# Patient Record
Sex: FEMALE | Race: OTHER
[De-identification: names, ages, dates, MRNs, and addresses within clinical notes are randomized per-mention and may not be internally consistent; named-entity substitution may affect disease eponyms.]

---

## 2018-11-17 ENCOUNTER — HOSPITAL ENCOUNTER (EMERGENCY)
Dept: HOSPITAL 72 - EMR | Age: 43
Discharge: HOME | End: 2018-11-17
Payer: SELF-PAY

## 2018-11-17 VITALS — HEIGHT: 63 IN | WEIGHT: 134 LBS | BODY MASS INDEX: 23.74 KG/M2

## 2018-11-17 VITALS — DIASTOLIC BLOOD PRESSURE: 67 MMHG | SYSTOLIC BLOOD PRESSURE: 106 MMHG
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
## 2018-11-17 DIAGNOSIS — S82.892A: Primary | ICD-10-CM

## 2018-11-17 LAB
ADD MANUAL DIFF: NO
ALBUMIN SERPL-MCNC: 3.5 G/DL (ref 3.4–5)
ALBUMIN/GLOB SERPL: 0.7 {RATIO} (ref 1–2.7)
ALP SERPL-CCNC: 179 U/L (ref 46–116)
ALT SERPL-CCNC: 25 U/L (ref 12–78)
ANION GAP SERPL CALC-SCNC: 8 MMOL/L (ref 5–15)
APTT BLD: 22 SEC (ref 23–33)
AST SERPL-CCNC: 16 U/L (ref 15–37)
BASOPHILS NFR BLD AUTO: 1.7 % (ref 0–2)
BILIRUB SERPL-MCNC: 0.1 MG/DL (ref 0.2–1)
BUN SERPL-MCNC: 13 MG/DL (ref 7–18)
CALCIUM SERPL-MCNC: 9.3 MG/DL (ref 8.5–10.1)
CHLORIDE SERPL-SCNC: 105 MMOL/L (ref 98–107)
CO2 SERPL-SCNC: 27 MMOL/L (ref 21–32)
CREAT SERPL-MCNC: 1.3 MG/DL (ref 0.55–1.3)
EOSINOPHIL NFR BLD AUTO: 0.2 % (ref 0–3)
ERYTHROCYTE [DISTWIDTH] IN BLOOD BY AUTOMATED COUNT: 15.9 % (ref 11.6–14.8)
GLOBULIN SER-MCNC: 5.2 G/DL
HCT VFR BLD CALC: 37.6 % (ref 37–47)
HGB BLD-MCNC: 12.5 G/DL (ref 12–16)
INR PPP: 1.2 (ref 0.9–1.1)
LYMPHOCYTES NFR BLD AUTO: 12.8 % (ref 20–45)
MCV RBC AUTO: 80 FL (ref 80–99)
MONOCYTES NFR BLD AUTO: 4 % (ref 1–10)
NEUTROPHILS NFR BLD AUTO: 81.2 % (ref 45–75)
PLATELET # BLD: 517 K/UL (ref 150–450)
POTASSIUM SERPL-SCNC: 3.5 MMOL/L (ref 3.5–5.1)
RBC # BLD AUTO: 4.67 M/UL (ref 4.2–5.4)
SODIUM SERPL-SCNC: 140 MMOL/L (ref 136–145)
WBC # BLD AUTO: 11 K/UL (ref 4.8–10.8)

## 2018-11-17 PROCEDURE — 80053 COMPREHEN METABOLIC PANEL: CPT

## 2018-11-17 PROCEDURE — 73610 X-RAY EXAM OF ANKLE: CPT

## 2018-11-17 PROCEDURE — 99284 EMERGENCY DEPT VISIT MOD MDM: CPT

## 2018-11-17 PROCEDURE — 96375 TX/PRO/DX INJ NEW DRUG ADDON: CPT

## 2018-11-17 PROCEDURE — 96374 THER/PROPH/DIAG INJ IV PUSH: CPT

## 2018-11-17 PROCEDURE — 85730 THROMBOPLASTIN TIME PARTIAL: CPT

## 2018-11-17 PROCEDURE — 84703 CHORIONIC GONADOTROPIN ASSAY: CPT

## 2018-11-17 PROCEDURE — 85025 COMPLETE CBC W/AUTO DIFF WBC: CPT

## 2018-11-17 PROCEDURE — 85610 PROTHROMBIN TIME: CPT

## 2018-11-17 PROCEDURE — 96376 TX/PRO/DX INJ SAME DRUG ADON: CPT

## 2018-11-17 PROCEDURE — 96372 THER/PROPH/DIAG INJ SC/IM: CPT

## 2018-11-17 NOTE — EMERGENCY ROOM REPORT
History of Present Illness


General


Chief Complaint:  left ankle pain


Source:  Patient





Present Illness


HPI


Patient 43-year-old female presented after increased left ankle pain.  Patient 

reports having fallen while rollerskating.  The patient states that she was 

drinking alcohol prior to injury.  She reports having felt a pop to her left 

ankle.  She reports having increased pain.  The patient presented injury the 

slightly prior to arrival.


Allergies:  


Coded Allergies:  


     No Known Allergies (Unverified , 11/17/18)





Patient History


Reviewed Nursing Documentation:  PMH: Agreed; PSxH: Agreed





Review of Systems


All Other Systems:  negative except mentioned in HPI





Physical Exam


General Appearance:  well appearing, no apparent distress, alert, GCS 15


Head:  normocephalic, atraumatic


ENT:  hearing grossly normal, normal voice


Neck:  full range of motion, supple


Respiratory:  no respiratory distress, speaking full sentences


Musculoskeletal:  no calf tenderness


Neurologic:  normal gait


Psychiatric:  mood/affect normal


Skin:  no rash





Medical Decision Making


Diagnostic Impression:  


 Primary Impression:  


 Ankle fracture, left


ER Course


Patient presented for ankle pain.  Differential diagnosis included was not 

limited to sprain, fracture, dislocation, cellulitis, vascular insufficiency.  X

-ray imaging of the ankle was ordered.  X-ray imaging of the left ankle showed 

the ankle fracture at distal tibia and distal fibula.  The patient was 

discussed with Dr. Andrew from orthopedics. The patient was advised 

nonweightbearing status.  Patient was placed in a 3 sided splint.  Patient was 

neurovascularly intact after splinting.  The patient was advised elevation.  

Patient is advised to return if she began having any numbness or tingling to 

her foot.  The she was advised to follow-up with orthopedics as soon as 

possible and to call Dr. Andrew for an appointment on Monday.


Status:  improved


Disposition:  HOME, SELF-CARE


Condition:  Stable


Scripts


Oxycodone/Acetaminophen 5-325* (PERCOCET 5-325 MG TABLET*) 1 Each Tablet


1 TAB ORAL Q6H PRN for For Pain, #20 TAB


   Prov: Zane Booker MD         11/17/18 


Ibuprofen* (MOTRIN*) 600 Mg Tablet


600 MG ORAL Q8H PRN for For Pain, #30 TAB 0 Refills


   Prov: Zane Booker MD         11/17/18











Zane Booker MD Nov 17, 2018 00:19